# Patient Record
(demographics unavailable — no encounter records)

---

## 2025-07-24 NOTE — HISTORY OF PRESENT ILLNESS
[FreeTextEntry1] : She is a 34-year-old female with long history of intermittent episodes of nausea, vomiting, mid epigastric abdominal pain associated with abdominal bloating.  She also admits to irregular bowel habits.  Her symptoms started  getting worse recently.  She also was placed on GLP-1  for weight loss, which she has since stopped.  She denies rectal bleeding.  She has a family history of colon cancer specifically her mother  who was diagnosed with colon cancer at age 40.

## 2025-07-24 NOTE — REASON FOR VISIT
[FreeTextEntry1] : Nausea vomiting abdominal pain bloating irregular bowel habits with constipation diarrhea family history of colon cancer

## 2025-07-24 NOTE — ASSESSMENT
[FreeTextEntry1] : She most likely has Irritable Bowel Syndrome  BIPIN MON was advised to undergo endoscopy to which she agreed. The procedure will be performed in [facility] with the assistance of an anesthesiologist. She was given a booklet distributed by the [employer] explaining the procedure in detail and she understood the risks of the procedure not limited to infection, bleeding, perforation or non- diagnosis of gastric or esophageal cancer.  She was advised that she could not drive home, if she chooses to receive sedation. Further diagnostic and treatment recommendations will be based upon the procedure and any biopsies, if they are taken. Thank you for allowing me to participate in this patients health care.  BIPIN MON was advised to undergo colonoscopy to which she agreed. The procedure will be performed in [facility] with the assistance of an anesthesiologist. The patient was given a Clenpiq  preparation prescription and understood the  procedure as it was explained to her. She was given a booklet distributed by the [employer] G[employer]  Endoscopy explaining the procedure in detail and she understood the risks of the procedure not limited to infection, bleeding, perforation or non- diagnosis of colorectal cancer. She was advised that she could not drive home, if she chooses to  receive sedation.  Further diagnostic and treatment recommendations will be based upon the procedure and any biopsies, if they are taken.  Thank you for allowing me to participate in this patients health care.  , Best personal regards -- Don   I spent 46 minutes with the patient and answered all of her questions explained both endoscopy and colonoscopy in detail

## 2025-07-24 NOTE — REVIEW OF SYSTEMS
[Abdominal Pain] : abdominal pain [Constipation] : constipation [Diarrhea] : diarrhea [Bloating (gassiness)] : bloating [Negative] : Heme/Lymph

## 2025-07-24 NOTE — ASSESSMENT
[FreeTextEntry1] : She most likely has Irritable Bowel Syndrome  BIPIN MON was advised to undergo endoscopy to which she agreed. The procedure will be performed in [facility] with the assistance of an anesthesiologist. She was given a booklet distributed by the [employer] End[employer] explaining the procedure in detail and she understood the risks of the procedure not limited to infection, bleeding, perforation or non- diagnosis of gastric or esophageal cancer.  She was advised that she could not drive home, if she chooses to receive sedation. Further diagnostic and treatment recommendations will be based upon the procedure and any biopsies, if they are taken. Thank you for allowing me to participate in this patients health care.  BIPIN MON was advised to undergo colonoscopy to which she agreed. The procedure will be performed in [facility] with the assistance of an anesthesiologist. The patient was given a Clenpiq  preparation prescription and understood the  procedure as it was explained to her. She was given a booklet distributed by the [employer] G[employer]  Endoscopy explaining the procedure in detail and she understood the risks of the procedure not limited to infection, bleeding, perforation or non- diagnosis of colorectal cancer. She was advised that she could not drive home, if she chooses to  receive sedation.  Further diagnostic and treatment recommendations will be based upon the procedure and any biopsies, if they are taken.  Thank you for allowing me to participate in this patients health care.  , Best personal regards -- Don   I spent 46 minutes with the patient and answered all of her questions explained both endoscopy and colonoscopy in detail